# Patient Record
Sex: FEMALE | Race: WHITE | NOT HISPANIC OR LATINO | URBAN - METROPOLITAN AREA
[De-identification: names, ages, dates, MRNs, and addresses within clinical notes are randomized per-mention and may not be internally consistent; named-entity substitution may affect disease eponyms.]

---

## 2022-01-27 ENCOUNTER — TELEPHONE (OUTPATIENT)
Dept: TRANSPLANT | Facility: CLINIC | Age: 42
End: 2022-01-27

## 2022-01-27 DIAGNOSIS — C85.90 NHL (NON-HODGKIN'S LYMPHOMA) (H): Primary | ICD-10-CM

## 2022-02-18 ENCOUNTER — TELEPHONE (OUTPATIENT)
Dept: TRANSPLANT | Facility: CLINIC | Age: 42
End: 2022-02-18
Payer: COMMERCIAL

## 2022-02-18 NOTE — TELEPHONE ENCOUNTER
Called pt. to confirm new appointment. The pt. Was unavailable. Left a message with the reason of the call,  Asked patient to call at her earliest convenience if she was currently admitted to a hospital or had any further questions or concerns.   Provided the office number.

## 2022-02-21 NOTE — PROGRESS NOTES
BMT / Cell Therapy Consultation      Alysha Diamond is a 42 year old female referred by Yovani Aguero for relapsed FL.      Disease presentation and baseline characteristics:      Diagnosis: Relapsed Follicular Lymphoma  - At presentation 8/2018 FLIPI at least 3 = high risk (anemia, stage IV, >4 LN regions, no LDH)  - [place] Stage IV at presentation, on relapsed no BMB performed  - Grade 1-2 on lymph node and bone marrow biopsy at presentation   - LDH within normal limits on relapse, normal counts on relpase  - Presentation: noticed some swollen nodes in her neck for the last few months. She eventually went to her primary care office 08/10/2018. At that time she was noted to have diffuse adenopathy concerning for lymphoma. She underwent CT scans of her neck, chest, abdomen and pelvis which revealed diffuse adenopathy and splenomegaly. CBC 08/10/2018 WBC 43.7 (84% lymphocytes)  Hemoglobin 10.7 Platelets 118. She has some night sweats at that time but no F/C, weight loss  - Relapse: right leg swelling, no B symptoms    Date Treatment Name Response Side Effects / Toxicities   8/30/2018 Bendamustine + Rituximab    - Cycle #1: 08/30/2018, complicated by a rituximab reaction that lead to an ER visit. Rituximab given 09/13/2018  - Cycle #2: 10/18/2018  - Cycle #3: 11/15/2018  - Cycle #4: 12/20/2018  - Cycle #5: 01/24/2019  - Cycle #6: 02/21/2019  - Maintenance Rituximab started 06/18/2019 - 07/15/2021, every 2 months x 2 years  CR 3/2019 PET/CT end of treatment    No routine follow up scans - Rectal Perforation 09/18/2018 requiring exploratory laparotomy with colostomy on 09/19/2018  - 12/26/2018 had perforated rectal stump and repeat laparotomy   01/06/2022 Obinutuzumab + CHOP    - Cycle #1 01/06/2022 (With Obinutuzumab at Day -7)  - Cycle #2 01/27/2022  - Cycle #3 2/17/2022  Pending restaging, clinical response with resolution of fluorescein edema Well toelrated                      HPI:  Please see my  entry above for hematologic history.   Here today for new patient visit.  She reports tolerating treatment fairly well so far.  Reports resolution of asymmetric right lower extremity edema that was her presentation at relapse.  Denies any B symptoms on relapse or currently.  No infectious complaints today and no neuropathy.  No bleeding.      ASSESSMENT AND PLAN:  42-year-old female with history of now first relapse follicular lymphoma, grade 1-2, FLIPI score of 3 on presentation, score mostly on relapse with normal LDH, normal counts, no B symptoms.  Started on reinduction with obinutuzumab CHOP, pending radiologic response assessment with clinical response.      I discussed with the patient today the overall approach to patients with follicular lymphoma being intolerant disease with multiple relapses.  I also discussed the available treatment options including but not limited to FDA approved CAR T-cells, and multiple clinical trials for patients with follicular lymphoma. I discussed that historically we approach patients in the first relapse depending on the duration of remission from initial induction.  Since she has relapsed beyond 24 months of initial induction, if she has clear chemosensitive disease on the interim and end of therapy scan I would not proceed with an autologous hematopoietic cell transplantation at this time.  The data supporting use of consolidation with autologous hematopoietic cell transplantation is in patients with relapsed earlier than 24 months, if they have chemosensitive disease, even that data was before the currently available options for follicular lymphoma including cellular therapy.  Also discussed the role of allogeneic transplantation and follicular lymphoma typically reserved for patients who relapse within 24 months of initial therapy and who are in subsequent relapses especially if needing multiple lines of therapy to get in remission.    I disussed with the patient the risk  and benefits of autologous hematopoietic cell transplantation. We discussed side effects of the conditioning regimen including fatigue nausea vomiting mouth sores diarrhea lung toxicity liver toxicity hair loss increased risk of infection bleeding sepsis and mortality rates of the order of 2-5%. We also discussed the long-term risk of increased secondary cancers including MDS and leukemia in less than 5% of the cases.    We also discussed with the patient expected time course of conditioning regimen and posttransplant recovery. We also discussed that after discharge patient's required to the near the transplant center for frequent daily visits initially to engraftment and medication adjustments for several months after.  Patient's must have a caregiver support also available for full 30 days after transplantation.     I disussed  as well the role, risk and benefits of allogeneic hematopoietic cell transplantation.  We will defer typing for now given that both patient and her sibling/sister are local and can be easily typed should we need that in the future.        Summary:   -Agree with interim PET/CT to confirm chemosensitivity if patient is in CR and remains in CR after the completion of reinduction will not proceed with consolidation with autologous transplantation.  -I will communicate with referring oncologist.  We will present the case to the group and to reach out to the patient and oncologist if recommendation changes.    I spent 120 minutes in the care of this patient today, which included time necessary for preparation for the visit, obtaining history, ordering medications/tests/procedures as medically indicated, review of pertinent medical literature, counseling of the patient, communication of recommendations to the care team, and documentation time.    Ac Damon       Pathology:       8/13/2018 Final Pathologic Diagnosis   Lymph node, right inguinal, needle core biopsy --         - Follicular  lymphoma, WHO grade 1-2 of 3   Comments   The lymphoma cells are positive for CD20, CD10, BCL-6, and BCL-2 with   monoclonal lambda surface light chain expression.  The Ki-67   proliferation index is 20%.     BMB 8/23/2018                      Clinical History   Newly diagnosed follicular lymphoma, grade 1-2/3 with diffuse   lymphadenopathy and splenomegaly.   Final Pathologic Diagnosis   Bone marrow, aspirate, touch imprint, clot section, and unilateral   core biopsy:         -Hypercellular marrow (>95%) with extensive involvement by   low-grade follicular lymphoma (80%)   Peripheral blood, morphology:       -Lymphocytosis and mild monocytosis       -Normocytic anemia with mild reticulocytosis       -Mild thrombocytopenia     12/16/2021 Lymph node, right groin, needle core biopsy:  Follicular lymphoma, WHO grade 1-2 of 3  Comment:  Flow cytometry is performed on a portion of this tissue and confirms a monoclonal lambda CD10+ B cell population, supporting the diagnosis (see separate results).  The lymphoma cells are positive for CD20 expression.  The Ki-67 proliferation index is low at 20%.    Images at OSH:    8/10/2018 CT CHEST, ABDOMEN, AND PELVIS     INDICATION: LYMPHADENOPATHY GEN, ABD MASS, BLE EDEMA.  TECHNIQUE: CT chest, abdomen, and pelvis. Dose reduction techniques were used.     FINDINGS:  CHEST:      LUNGS AND PLEURA: Large right pleural effusion. There is a 1.2 cm soft tissue nodule along the pleura in the medial right lung base, series 2 image 68. Small left pleural effusion.     MEDIASTINUM: Bulky bilateral supraclavicular and bilateral axillary lymphadenopathy. There is also diffuse mediastinal lymphadenopathy and mild bilateral hilar lymphadenopathy. Bilateral cardiophrenic lymphadenopathy. For example, a discrete left axillary lymph node measures 4.1 x 2.4 cm, series 2 image 29. Ectatic ascending thoracic aorta measures 3.8 cm. Heart size is normal.     ABDOMEN: Splenomegaly. The spleen measures  24.5 cm in length. Bulky periportal, mesenteric, and periaortic/aortocaval retroperitoneal lymphadenopathy. For example, conglomerate retroperitoneal lymphadenopathy measures 8.0 x 4.7 cm, series 2 image 137. Less bulky gastrohepatic lymphadenopathy. Small volume ascites. Liver, gallbladder, pancreas, both adrenal glands, both kidneys, and stomach are normal.     PELVIS: Normal caliber abdominal aorta and IVC. Bulky bilateral common iliac, external iliac, internal iliac and inguinal lymphadenopathy. For example, right external iliac lymph node measures 5.8 x 3.6 cm, series 2 image 175. IUD in the uterus. Moderate volume ascites. Grossly normal bladder. Colonic diverticulosis without diverticulitis. Normal appendix.     MUSCULOSKELETAL: Diffuse subcutaneous edema. Mild degenerative changes in the spine.     CONCLUSION:  1. Bulky lymphadenopathy in the chest, abdomen, and pelvis consistent with lymphoma.  2. Massive splenomegaly.  3. Large malignant right pleural effusion with right pleural nodule. Small left pleural effusion.  4. Moderate volume ascites.  5. Diffuse subcutaneous edema.        PET/CT 12/20/2021     INDICATION: Subsequent treatment strategy for restaging for follicular lymphoma, grade II, lymph nodes of multiple sites. Status post chemotherapy/immunotherapy completed in July 2021. Monitor treatment response   COMPARISON: FDG PET/CT dated 03/18/2019 and ultrasound-guided lymph node biopsy dated 12/16/2021   TECHNIQUE: Serum glucose level 137 mg/dL. One hour post intravenous administration of 11.1 mCi F-18 FDG, PET imaging was performed from the skull base to mid thigh, utilizing attenuation correction with concurrent axial CT and PET/CT image fusion. Dose reduction techniques were used.     FINDINGS: Development of left greater than right supraclavicular (max SUV 6.4), prevascular station 3A (max SUV 5.6), left greater than right internal mammary (max SUV 3.9), left greater than right axillary (max SUV  "6.4), cardiophrenic (max SUV 4.4), right greater than left retrocrural (max SUV 4.1), retrocaval (max SUV 8.4), right greater than left common iliac (max SUV 10.0), conglomerate right greater than left external iliac (max SUV 10.4), right greater than left inguinal (max SUV 12.2) suspicious for recurrent lymphoma involving lymph nodes above and below the diaphragm. The remaining FDG uptake is physiologic from the skull base to mid thigh.     Pelvic phleboliths. Intrauterine device within the endometrial canal. Edematous change and swelling of the right lower extremity likely due to venous/lymphatic congestion/obstruction. Multilevel degenerative changes of spine.     IMPRESSION:     Findings suspicious for recurrent lymphoma involving lymph nodes above and below the diaphragm.      ROS:    10 point ROS neg other than the symptoms noted above in the HPI.      Past Medical/Surgical History:  No significant past medical history.  FL as above    Past Surgical History:   Procedure Laterality Date     L FEMUR/KNEE SURG UNLISTED     Family History: No known family history of hematologic malignancies.  Family History   Problem Relation Age of Onset     Stroke Maternal Grandmother     Cancer unknown, Other Maternal Grandfather     \"throat\" Cancer, Other Paternal Grandmother     Lung cancer, Other Paternal Grandfather     Social History: She is , has one son. She works as a  locally. She is a \"former\" smoker having quit 4 years ago with her current symptoms, she has smoked small amounts on and off over the last 20 years.   Social History   Sister 39 and heatlhy has no kids    Social History     Marital status: Single   Spouse name: N/A     Number of children: N/A     Years of education: N/A     Social History Main Topics     Smoking status: Current Some Day Smoker   Packs/day: 0.25   Years: 8.00   Types: Cigarettes     Smokeless tobacco: Never Used     Alcohol use 1.8 - 4.8 oz/week   0 Cans of beer, 0 " Standard drinks or equivalent, 0 Shots of liquor, 3 - 8 Glasses of wine per week - not currently on treatment    Drug use: No     Sexual activity: Not on file     Other Topics Concern     Not on file     Social History Narrative     Allergies:  No Known Allergies        Social History     Tobacco Use     Smoking status: Not on file     Smokeless tobacco: Not on file   Substance Use Topics     Alcohol use: Not on file     Drug use: Not on file         Not on File     No current outpatient medications on file.         Physical Exam:     Vital Signs: There were no vitals taken for this visit.        KPS:  80-90    General Appearance: alert and no distress  Eyes: PERRL, conjunctiva and lids normal, sclera nonicteric  Ears/Nose/M/Throat: Oral mucosa and posterior oropharynx normal, moist mucous membranes  Neck supple, non-tender, free range of motion, no adenopathy  Cardio/Vascular:regular rate and rhythm, normal S1 and S2, no murmur  Resp Effort And Auscultation: Normal - Clear to auscultation without rales, rhonchi, or wheezing.  GI: soft, nontender, bowel sounds present in all four quadrants, no hepatosplenomegaly  Lymphatics:no significant enlargement of lymph nodes globally   Musculoskeletal: Musculoskeletal normal  Edema: none  Skin: Skin color, texture, turgor normal. No rashes or lesions.  Neurologic: Gait normal.  Psych/Affect: Mood and affect are appropriate.      Alysha understood the above assessment and recommendations.  Multiple questions answered.  No barriers to learning identified.       Known issues that I take into account for medical decisions, with salient changes to the plan considering these complexities noted above.      ------------------------------------------------------------------------------------------------------------------------------------------------    Patient Care Team       Relationship Specialty Notifications Start End    Annelise Villarreal MD PCP - General Family Medicine  1/28/22      Phone: [phone] Fax: [phone]         [facility] [address] [place] [address]

## 2022-02-22 ENCOUNTER — OFFICE VISIT (OUTPATIENT)
Dept: TRANSPLANT | Facility: CLINIC | Age: 42
End: 2022-02-22
Attending: INTERNAL MEDICINE
Payer: COMMERCIAL

## 2022-02-22 VITALS — HEIGHT: 67 IN | BODY MASS INDEX: 29.82 KG/M2 | WEIGHT: 190 LBS | OXYGEN SATURATION: 99 % | HEART RATE: 67 BPM

## 2022-02-22 DIAGNOSIS — C82.18 GRADE 2 FOLLICULAR LYMPHOMA OF LYMPH NODES OF MULTIPLE REGIONS (H): Primary | ICD-10-CM

## 2022-02-22 PROCEDURE — 99205 OFFICE O/P NEW HI 60 MIN: CPT | Performed by: INTERNAL MEDICINE

## 2022-02-22 PROCEDURE — G0463 HOSPITAL OUTPT CLINIC VISIT: HCPCS

## 2022-02-22 PROCEDURE — 99417 PROLNG OP E/M EACH 15 MIN: CPT | Performed by: INTERNAL MEDICINE

## 2022-02-22 RX ORDER — LIDOCAINE/PRILOCAINE 2.5 %-2.5%
CREAM (GRAM) TOPICAL
COMMUNITY
Start: 2022-01-06

## 2022-02-22 RX ORDER — FLUCONAZOLE 200 MG/1
TABLET ORAL
COMMUNITY
Start: 2022-02-14

## 2022-02-22 RX ORDER — ACYCLOVIR 400 MG/1
TABLET ORAL
COMMUNITY
Start: 2022-02-01

## 2022-02-22 ASSESSMENT — PAIN SCALES - GENERAL: PAINLEVEL: NO PAIN (0)

## 2022-02-22 NOTE — NURSING NOTE
"Oncology Rooming Note    February 22, 2022 12:45 PM   Alysha Diamond is a 42 year old female who presents for:    Chief Complaint   Patient presents with     Oncology Clinic Visit     NHL     Initial Vitals: Pulse 67   Ht 1.702 m (5' 7\")   Wt 86.2 kg (190 lb)   SpO2 99%   BMI 29.76 kg/m   Estimated body mass index is 29.76 kg/m  as calculated from the following:    Height as of this encounter: 1.702 m (5' 7\").    Weight as of this encounter: 86.2 kg (190 lb). Body surface area is 2.02 meters squared.  No Pain (0) Comment: Data Unavailable   No LMP recorded. (Menstrual status: IUD).  Allergies reviewed: Yes  Medications reviewed: Yes    Medications: Medication refills not needed today.  Pharmacy name entered into [facility] [place]: [id number] - [place] - [address] AT [facility] [address]    Clinical concerns: none       Jessie Loco CMA            "

## 2022-02-22 NOTE — LETTER
2/22/2022         RE: Alysha Diamond  [address]  [facility] [address]        Dear Colleague,    Thank you for referring your patient, Alysha Diamond, to the [facility] BLOOD AND MARROW TRANSPLANT PROGRAM [place]. Please see a copy of my visit note below.           BMT / Cell Therapy Consultation      Alysha Diamond is a 42 year old female referred by Yovani Aguero for relapsed FL.      Disease presentation and baseline characteristics:      Diagnosis: Relapsed Follicular Lymphoma  - At presentation 8/2018 FLIPI at least 3 = high risk (anemia, stage IV, >4 LN regions, no LDH)  - [place] Stage IV at presentation, on relapsed no BMB performed  - Grade 1-2 on lymph node and bone marrow biopsy at presentation   - LDH within normal limits on relapse, normal counts on relpase  - Presentation: noticed some swollen nodes in her neck for the last few months. She eventually went to her primary care office 08/10/2018. At that time she was noted to have diffuse adenopathy concerning for lymphoma. She underwent CT scans of her neck, chest, abdomen and pelvis which revealed diffuse adenopathy and splenomegaly. CBC 08/10/2018 WBC 43.7 (84% lymphocytes)  Hemoglobin 10.7 Platelets 118. She has some night sweats at that time but no F/C, weight loss  - Relapse: right leg swelling, no B symptoms    Date Treatment Name Response Side Effects / Toxicities   8/30/2018 Bendamustine + Rituximab    - Cycle #1: 08/30/2018, complicated by a rituximab reaction that lead to an ER visit. Rituximab given 09/13/2018  - Cycle #2: 10/18/2018  - Cycle #3: 11/15/2018  - Cycle #4: 12/20/2018  - Cycle #5: 01/24/2019  - Cycle #6: 02/21/2019  - Maintenance Rituximab started 06/18/2019 - 07/15/2021, every 2 months x 2 years  CR 3/2019 PET/CT end of treatment    No routine follow up scans - Rectal Perforation 09/18/2018 requiring exploratory laparotomy with colostomy on 09/19/2018  - 12/26/2018 had perforated rectal stump and repeat  laparotomy   01/06/2022 Obinutuzumab + CHOP    - Cycle #1 01/06/2022 (With Obinutuzumab at Day -7)  - Cycle #2 01/27/2022  - Cycle #3 2/17/2022  Pending restaging, clinical response with resolution of fluorescein edema Well toelrated                      HPI:  Please see my entry above for hematologic history.   Here today for new patient visit.  She reports tolerating treatment fairly well so far.  Reports resolution of asymmetric right lower extremity edema that was her presentation at relapse.  Denies any B symptoms on relapse or currently.  No infectious complaints today and no neuropathy.  No bleeding.      ASSESSMENT AND PLAN:  42-year-old female with history of now first relapse follicular lymphoma, grade 1-2, FLIPI score of 3 on presentation, score mostly on relapse with normal LDH, normal counts, no B symptoms.  Started on reinduction with obinutuzumab CHOP, pending radiologic response assessment with clinical response.      I discussed with the patient today the overall approach to patients with follicular lymphoma being intolerant disease with multiple relapses.  I also discussed the available treatment options including but not limited to FDA approved CAR T-cells, and multiple clinical trials for patients with follicular lymphoma. I discussed that historically we approach patients in the first relapse depending on the duration of remission from initial induction.  Since she has relapsed beyond 24 months of initial induction, if she has clear chemosensitive disease on the interim and end of therapy scan I would not proceed with an autologous hematopoietic cell transplantation at this time.  The data supporting use of consolidation with autologous hematopoietic cell transplantation is in patients with relapsed earlier than 24 months, if they have chemosensitive disease, even that data was before the currently available options for follicular lymphoma including cellular therapy.  Also discussed the role of  allogeneic transplantation and follicular lymphoma typically reserved for patients who relapse within 24 months of initial therapy and who are in subsequent relapses especially if needing multiple lines of therapy to get in remission.    I disussed with the patient the risk and benefits of autologous hematopoietic cell transplantation. We discussed side effects of the conditioning regimen including fatigue nausea vomiting mouth sores diarrhea lung toxicity liver toxicity hair loss increased risk of infection bleeding sepsis and mortality rates of the order of 2-5%. We also discussed the long-term risk of increased secondary cancers including MDS and leukemia in less than 5% of the cases.    We also discussed with the patient expected time course of conditioning regimen and posttransplant recovery. We also discussed that after discharge patient's required to the near the transplant center for frequent daily visits initially to engraftment and medication adjustments for several months after.  Patient's must have a caregiver support also available for full 30 days after transplantation.     I disussed  as well the role, risk and benefits of allogeneic hematopoietic cell transplantation.  We will defer typing for now given that both patient and her sibling/sister are local and can be easily typed should we need that in the future.        Summary:   -Agree with interim PET/CT to confirm chemosensitivity if patient is in CR and remains in CR after the completion of reinduction will not proceed with consolidation with autologous transplantation.  -I will communicate with referring oncologist.  We will present the case to the group and to reach out to the patient and oncologist if recommendation changes.    I spent 120 minutes in the care of this patient today, which included time necessary for preparation for the visit, obtaining history, ordering medications/tests/procedures as medically indicated, review of pertinent  medical literature, counseling of the patient, communication of recommendations to the care team, and documentation time.    Ac Damon       Pathology:       8/13/2018 Final Pathologic Diagnosis   Lymph node, right inguinal, needle core biopsy --         - Follicular lymphoma, WHO grade 1-2 of 3   Comments   The lymphoma cells are positive for CD20, CD10, BCL-6, and BCL-2 with   monoclonal lambda surface light chain expression.  The Ki-67   proliferation index is 20%.     BMB 8/23/2018                      Clinical History   Newly diagnosed follicular lymphoma, grade 1-2/3 with diffuse   lymphadenopathy and splenomegaly.   Final Pathologic Diagnosis   Bone marrow, aspirate, touch imprint, clot section, and unilateral   core biopsy:         -Hypercellular marrow (>95%) with extensive involvement by   low-grade follicular lymphoma (80%)   Peripheral blood, morphology:       -Lymphocytosis and mild monocytosis       -Normocytic anemia with mild reticulocytosis       -Mild thrombocytopenia     12/16/2021 Lymph node, right groin, needle core biopsy:  Follicular lymphoma, WHO grade 1-2 of 3  Comment:  Flow cytometry is performed on a portion of this tissue and confirms a monoclonal lambda CD10+ B cell population, supporting the diagnosis (see separate results).  The lymphoma cells are positive for CD20 expression.  The Ki-67 proliferation index is low at 20%.    Images at OSH:    8/10/2018 CT CHEST, ABDOMEN, AND PELVIS     INDICATION: LYMPHADENOPATHY GEN, ABD MASS, BLE EDEMA.  TECHNIQUE: CT chest, abdomen, and pelvis. Dose reduction techniques were used.     FINDINGS:  CHEST:      LUNGS AND PLEURA: Large right pleural effusion. There is a 1.2 cm soft tissue nodule along the pleura in the medial right lung base, series 2 image 68. Small left pleural effusion.     MEDIASTINUM: Bulky bilateral supraclavicular and bilateral axillary lymphadenopathy. There is also diffuse mediastinal lymphadenopathy and mild bilateral  hilar lymphadenopathy. Bilateral cardiophrenic lymphadenopathy. For example, a discrete left axillary lymph node measures 4.1 x 2.4 cm, series 2 image 29. Ectatic ascending thoracic aorta measures 3.8 cm. Heart size is normal.     ABDOMEN: Splenomegaly. The spleen measures 24.5 cm in length. Bulky periportal, mesenteric, and periaortic/aortocaval retroperitoneal lymphadenopathy. For example, conglomerate retroperitoneal lymphadenopathy measures 8.0 x 4.7 cm, series 2 image 137. Less bulky gastrohepatic lymphadenopathy. Small volume ascites. Liver, gallbladder, pancreas, both adrenal glands, both kidneys, and stomach are normal.     PELVIS: Normal caliber abdominal aorta and IVC. Bulky bilateral common iliac, external iliac, internal iliac and inguinal lymphadenopathy. For example, right external iliac lymph node measures 5.8 x 3.6 cm, series 2 image 175. IUD in the uterus. Moderate volume ascites. Grossly normal bladder. Colonic diverticulosis without diverticulitis. Normal appendix.     MUSCULOSKELETAL: Diffuse subcutaneous edema. Mild degenerative changes in the spine.     CONCLUSION:  1. Bulky lymphadenopathy in the chest, abdomen, and pelvis consistent with lymphoma.  2. Massive splenomegaly.  3. Large malignant right pleural effusion with right pleural nodule. Small left pleural effusion.  4. Moderate volume ascites.  5. Diffuse subcutaneous edema.        PET/CT 12/20/2021     INDICATION: Subsequent treatment strategy for restaging for follicular lymphoma, grade II, lymph nodes of multiple sites. Status post chemotherapy/immunotherapy completed in July 2021. Monitor treatment response   COMPARISON: FDG PET/CT dated 03/18/2019 and ultrasound-guided lymph node biopsy dated 12/16/2021   TECHNIQUE: Serum glucose level 137 mg/dL. One hour post intravenous administration of 11.1 mCi F-18 FDG, PET imaging was performed from the skull base to mid thigh, utilizing attenuation correction with concurrent axial CT and  "PET/CT image fusion. Dose reduction techniques were used.     FINDINGS: Development of left greater than right supraclavicular (max SUV 6.4), prevascular station 3A (max SUV 5.6), left greater than right internal mammary (max SUV 3.9), left greater than right axillary (max SUV 6.4), cardiophrenic (max SUV 4.4), right greater than left retrocrural (max SUV 4.1), retrocaval (max SUV 8.4), right greater than left common iliac (max SUV 10.0), conglomerate right greater than left external iliac (max SUV 10.4), right greater than left inguinal (max SUV 12.2) suspicious for recurrent lymphoma involving lymph nodes above and below the diaphragm. The remaining FDG uptake is physiologic from the skull base to mid thigh.     Pelvic phleboliths. Intrauterine device within the endometrial canal. Edematous change and swelling of the right lower extremity likely due to venous/lymphatic congestion/obstruction. Multilevel degenerative changes of spine.     IMPRESSION:     Findings suspicious for recurrent lymphoma involving lymph nodes above and below the diaphragm.      ROS:    10 point ROS neg other than the symptoms noted above in the HPI.      Past Medical/Surgical History:  No significant past medical history.  FL as above    Past Surgical History:   Procedure Laterality Date     L FEMUR/KNEE SURG UNLISTED     Family History: No known family history of hematologic malignancies.  Family History   Problem Relation Age of Onset     Stroke Maternal Grandmother     Cancer unknown, Other Maternal Grandfather     \"throat\" Cancer, Other Paternal Grandmother     Lung cancer, Other Paternal Grandfather     Social History: She is , has one son. She works as a  locally. She is a \"former\" smoker having quit 4 years ago with her current symptoms, she has smoked small amounts on and off over the last 20 years.   Social History   Sister 39 and heatlhy has no kids    Social History     Marital status: Single   Spouse " name: N/A     Number of children: N/A     Years of education: N/A     Social History Main Topics     Smoking status: Current Some Day Smoker   Packs/day: 0.25   Years: 8.00   Types: Cigarettes     Smokeless tobacco: Never Used     Alcohol use 1.8 - 4.8 oz/week   0 Cans of beer, 0 Standard drinks or equivalent, 0 Shots of liquor, 3 - 8 Glasses of wine per week - not currently on treatment    Drug use: No     Sexual activity: Not on file     Other Topics Concern     Not on file     Social History Narrative     Allergies:  No Known Allergies        Social History     Tobacco Use     Smoking status: Not on file     Smokeless tobacco: Not on file   Substance Use Topics     Alcohol use: Not on file     Drug use: Not on file         Not on File     No current outpatient medications on file.         Physical Exam:     Vital Signs: There were no vitals taken for this visit.        KPS:  80-90    General Appearance: alert and no distress  Eyes: PERRL, conjunctiva and lids normal, sclera nonicteric  Ears/Nose/M/Throat: Oral mucosa and posterior oropharynx normal, moist mucous membranes  Neck supple, non-tender, free range of motion, no adenopathy  Cardio/Vascular:regular rate and rhythm, normal S1 and S2, no murmur  Resp Effort And Auscultation: Normal - Clear to auscultation without rales, rhonchi, or wheezing.  GI: soft, nontender, bowel sounds present in all four quadrants, no hepatosplenomegaly  Lymphatics:no significant enlargement of lymph nodes globally   Musculoskeletal: Musculoskeletal normal  Edema: none  Skin: Skin color, texture, turgor normal. No rashes or lesions.  Neurologic: Gait normal.  Psych/Affect: Mood and affect are appropriate.      Alysha understood the above assessment and recommendations.  Multiple questions answered.  No barriers to learning identified.       Known issues that I take into account for medical decisions, with salient changes to the plan considering these complexities noted  above.      ------------------------------------------------------------------------------------------------------------------------------------------------    Patient Care Team       Relationship Specialty Notifications Start End    Annelise Villarreal MD PCP - General Family Medicine  1/28/22     Phone: [phone] Fax: [phone]         [address] [address]            Again, thank you for allowing me to participate in the care of your patient.      Sincerely,    Ac Damon MD

## 2022-02-23 ENCOUNTER — VIRTUAL VISIT (OUTPATIENT)
Dept: TRANSPLANT | Facility: CLINIC | Age: 42
End: 2022-02-23
Attending: INTERNAL MEDICINE
Payer: COMMERCIAL

## 2022-02-23 DIAGNOSIS — Z71.9 ENCOUNTER FOR COUNSELING: Primary | ICD-10-CM

## 2022-02-23 NOTE — PROGRESS NOTES
Per Dr. Jarocho Damon's note and RN MD Pallavi is not recommending transplant at this time and pt does not want to see SW. Pt will have a repeat NT if pt comes for transplant in the future.     MANUEL Moctezuma, [facility]  Phone: [phone]  Pager: [phone]

## 2022-02-23 NOTE — LETTER
2/23/2022         RE: Alysha Diamond  [address]  [place] [address]        Dear Colleague,    Thank you for referring your patient, Alysha Diamond, to the [facility] BLOOD AND MARROW TRANSPLANT PROGRAM [place]. Please see a copy of my visit note below.    Per Dr. Jarocho Damon's note and RN MD Pallavi is not recommending transplant at this time and pt does not want to see SW. Pt will have a repeat NT if pt comes for transplant in the future.         Again, thank you for allowing me to participate in the care of your patient.      Sincerely,    MANUEL Moctezuma

## 2022-02-24 ENCOUNTER — VIRTUAL VISIT (OUTPATIENT)
Dept: TRANSPLANT | Facility: CLINIC | Age: 42
End: 2022-02-24
Attending: INTERNAL MEDICINE
Payer: COMMERCIAL

## 2022-02-24 DIAGNOSIS — C85.90 NHL (NON-HODGKIN'S LYMPHOMA) (H): Primary | ICD-10-CM

## 2022-02-24 NOTE — LETTER
2/24/2022         RE: Alysha Diamond  [address]  [facility] [address]        Dear Colleague,    Thank you for referring your patient, Alysha Diamond, to the [facility] BLOOD AND MARROW TRANSPLANT PROGRAM [place]. Please see a copy of my visit note below.    Spoke with Alysha following new transplant visit with Dr. Jarocho Damon. Reviewed plan of care per NT conversation for Stem Cell Transplant. Explained role of the Nurse Coordinator throughout the BMT process as well as general time line and expectations for transplant. Discussed necessity of caregiver and program's proximity requirements. All questions were answered.     Plan: No Transplant, pending chemo sensitive response to current therapy- will have repeat NT if non-responsive as suggested intervention varies widely    Contact information provided for :  yes    HLA typing drawn: no    PRA typing drawn:  no    CMV-IgG and ABO-Rh drawn or in record:  no    Contact information provided for :  no    Financial Release for [facility] search obtained:  no    [id number] Consent Signed: no    EOC Reason updated: yes          Again, thank you for allowing me to participate in the care of your patient.      Sincerely,    BMT Nurse Coordinator

## 2022-02-28 NOTE — PROGRESS NOTES
Spoke with Alysha following new transplant visit with Dr. Jarocho Damon. Reviewed plan of care per NT conversation for Stem Cell Transplant. Explained role of the Nurse Coordinator throughout the BMT process as well as general time line and expectations for transplant. Discussed necessity of caregiver and program's proximity requirements. All questions were answered.     Plan: No Transplant, pending chemo sensitive response to current therapy- will have repeat NT if non-responsive as suggested intervention varies widely    Contact information provided for :  yes    HLA typing drawn: no    PRA typing drawn:  no    CMV-IgG and ABO-Rh drawn or in record:  no    Contact information provided for :  no    Financial Release for [facility] search obtained:  no    [id number] Consent Signed: no    EOC Reason updated: yes

## 2022-04-03 ENCOUNTER — HEALTH MAINTENANCE LETTER (OUTPATIENT)
Age: 42
End: 2022-04-03

## 2022-10-03 ENCOUNTER — HEALTH MAINTENANCE LETTER (OUTPATIENT)
Age: 42
End: 2022-10-03

## 2023-05-20 ENCOUNTER — HEALTH MAINTENANCE LETTER (OUTPATIENT)
Age: 43
End: 2023-05-20

## 2024-03-10 ENCOUNTER — HEALTH MAINTENANCE LETTER (OUTPATIENT)
Age: 44
End: 2024-03-10

## 2024-07-28 ENCOUNTER — HEALTH MAINTENANCE LETTER (OUTPATIENT)
Age: 44
End: 2024-07-28